# Patient Record
Sex: FEMALE | Race: WHITE
[De-identification: names, ages, dates, MRNs, and addresses within clinical notes are randomized per-mention and may not be internally consistent; named-entity substitution may affect disease eponyms.]

---

## 2017-04-26 ENCOUNTER — HOSPITAL ENCOUNTER (OUTPATIENT)
Dept: HOSPITAL 53 - M SFHCWAGY | Age: 62
End: 2017-04-26
Attending: NURSE PRACTITIONER
Payer: COMMERCIAL

## 2017-04-26 ENCOUNTER — HOSPITAL ENCOUNTER (OUTPATIENT)
Dept: HOSPITAL 53 - M WHC | Age: 62
End: 2017-04-26
Attending: NURSE PRACTITIONER
Payer: COMMERCIAL

## 2017-04-26 DIAGNOSIS — Z12.31: Primary | ICD-10-CM

## 2017-04-26 DIAGNOSIS — Z80.3: ICD-10-CM

## 2017-04-26 DIAGNOSIS — Z12.4: Primary | ICD-10-CM

## 2017-04-26 DIAGNOSIS — N95.2: ICD-10-CM

## 2017-04-26 DIAGNOSIS — Z78.0: ICD-10-CM

## 2017-04-26 NOTE — REPMRS
Patient History

The patient states she had a clinical breast exam in 04/17

Patient is postmenopausal.

Family history of breast cancer in mother at age 50 or over.

 

Digital Woman Screen Mammo: April 26, 2017 - Exam #: 

[id number]

Bilateral CC and MLO view(s) were taken.

 

Technologist: Gissel Loco, Technologist

Prior study comparison: April 24, 2015, digital woman screen 

mammo performed at [facility].  September 14, 2009,

bilateral digital mammo screening bilat performed at [facility].

 

FINDINGS: There are scattered fibroglandular densities.  There 

has been no change in the appearance of the mammogram from the 

prior studies.  There is a mild amount of residual fibroglandular

tissue which is fairly symmetric. There is no interval 

development of dominant mass, architectural distortion, or 

clustered microcalcification suggestive of malignancy.

 

ASSESSMENT: BI-RADS/ACR category 1 mammogram. Negative.

 

Recommendation

Routine screening mammogram in 1 year (for women over age 40).

This mammogram was interpreted with the aid of an FDA-approved 

computer-aided dectection system.

 

Electronically Signed By: Yahir Viera MD 04/26/17 [id number]

## 2020-10-08 ENCOUNTER — HOSPITAL ENCOUNTER (EMERGENCY)
Dept: HOSPITAL 53 - M ED | Age: 65
Discharge: HOME | End: 2020-10-08
Payer: MEDICARE

## 2020-10-08 VITALS — BODY MASS INDEX: 28.23 KG/M2 | HEIGHT: 64 IN | WEIGHT: 165.35 LBS

## 2020-10-08 VITALS — DIASTOLIC BLOOD PRESSURE: 67 MMHG | SYSTOLIC BLOOD PRESSURE: 150 MMHG

## 2020-10-08 DIAGNOSIS — H81.10: Primary | ICD-10-CM

## 2020-10-08 LAB
HCT VFR BLD AUTO: 40.6 % (ref 36–47)
HGB BLD-MCNC: 13.5 G/DL (ref 12–15.5)
MCH RBC QN AUTO: 29.3 PG (ref 27–33)
MCHC RBC AUTO-ENTMCNC: 33.3 G/DL (ref 32–36.5)
MCV RBC AUTO: 88.3 FL (ref 80–96)
PLATELET # BLD AUTO: 256 10^3/UL (ref 150–450)
RBC # BLD AUTO: 4.6 10^6/UL (ref 4–5.4)
WBC # BLD AUTO: 9.8 10^3/UL (ref 4–10)

## 2020-10-08 NOTE — REPVR
PROCEDURE INFORMATION: 

Exam: CT Head Without Contrast 

Exam date and time: 10/8/2020 10:58 AM 

Age: 64 years old 

Clinical indication: Dizziness; Additional info: Dizzy 



TECHNIQUE: 

Imaging protocol: Computed tomography of the head without contrast. 

Radiation optimization: All CT scans at this facility use at least one of these 

dose optimization techniques: automated exposure control; mA and/or kV 

adjustment per patient size (includes targeted exams where dose is matched to 

clinical indication); or iterative reconstruction. 



COMPARISON: 

No relevant prior studies available. 



FINDINGS: 

Brain: Normal. No hemorrhage. Unremarkable white matter. No mass effect. 

Cerebral ventricles: No ventriculomegaly. 

Bones/joints: Unremarkable. No acute fracture. 

Paranasal sinuses: Visualized sinuses are unremarkable. No fluid levels. 

Mastoid air cells: Visualized mastoid air cells are well aerated. 

Soft tissues: Unremarkable. 



IMPRESSION: 

No acute intracranial abnormality. 



Electronically signed by: Jeanie Farias On 10/08/2020  11:16:40 AM

## 2020-10-09 NOTE — ECGEPIP
[facility] - ED

                                       

                                       Test Date:    2020-10-08

Pat Name:     TYSON DUTTA           Department:   

Patient ID:   [id number]                 Room:         -

Gender:       Female                   Technician:   [id number]

:          1955               Requested By: Susanna Roth 

Order Number: [id number]     Reading MD:   Zafar White

                                 Measurements

Intervals                              Axis          

Rate:         65                       P:            4

MN:           152                      QRS:          -15

QRSD:         104                      T:            -2

QT:           388                                    

QTc:          404                                    

                           Interpretive Statements

SINUS RHYTHM

POOR R WAVE PROGRESSION

MODERATE VOLTAGE CRITERIA FOR LVH, CONSIDER NORMAL VARIANT

MODERATE INTRAVENTRICULAR CONDUCTION DELAY

NO PRIORS FOR COMPARISON

Electronically Signed on 10-9-2020 7:19:09 EDT by Zafar White

## 2020-10-15 ENCOUNTER — HOSPITAL ENCOUNTER (OUTPATIENT)
Dept: HOSPITAL 53 - M PT | Age: 65
LOS: 16 days | End: 2020-10-31
Payer: MEDICARE

## 2020-10-15 DIAGNOSIS — R42: Primary | ICD-10-CM

## 2023-02-16 ENCOUNTER — HOSPITAL ENCOUNTER (OUTPATIENT)
Dept: HOSPITAL 53 - M SFHCWAGY | Age: 68
End: 2023-02-16
Payer: MEDICARE

## 2023-02-16 ENCOUNTER — HOSPITAL ENCOUNTER (OUTPATIENT)
Dept: HOSPITAL 53 - M WHC | Age: 68
End: 2023-02-16
Payer: MEDICARE

## 2023-02-16 DIAGNOSIS — Z12.31: Primary | ICD-10-CM

## 2023-02-16 DIAGNOSIS — Z12.4: Primary | ICD-10-CM

## 2023-02-16 PROCEDURE — 87624 HPV HI-RISK TYP POOLED RSLT: CPT

## 2024-10-21 ENCOUNTER — HOSPITAL ENCOUNTER (OUTPATIENT)
Dept: HOSPITAL 53 - M WHC | Age: 69
End: 2024-10-21
Payer: MEDICARE

## 2024-10-21 DIAGNOSIS — Z12.31: Primary | ICD-10-CM

## 2024-10-21 DIAGNOSIS — R92.323: ICD-10-CM

## 2025-01-28 ENCOUNTER — HOSPITAL ENCOUNTER (OUTPATIENT)
Dept: HOSPITAL 53 - M OPP | Age: 70
Discharge: HOME | End: 2025-01-28
Attending: SURGERY
Payer: MEDICARE

## 2025-01-28 VITALS — TEMPERATURE: 96.7 F

## 2025-01-28 VITALS — WEIGHT: 167.8 LBS | HEIGHT: 64 IN | BODY MASS INDEX: 28.65 KG/M2

## 2025-01-28 VITALS — SYSTOLIC BLOOD PRESSURE: 117 MMHG | DIASTOLIC BLOOD PRESSURE: 62 MMHG | OXYGEN SATURATION: 97 %

## 2025-01-28 DIAGNOSIS — R19.5: ICD-10-CM

## 2025-01-28 DIAGNOSIS — D12.5: ICD-10-CM

## 2025-01-28 DIAGNOSIS — Z12.11: Primary | ICD-10-CM

## 2025-07-28 ENCOUNTER — HOSPITAL ENCOUNTER (OUTPATIENT)
Dept: HOSPITAL 53 - M RAD | Age: 70
End: 2025-07-28
Payer: MEDICARE

## 2025-07-28 DIAGNOSIS — H81.11: Primary | ICD-10-CM

## 2025-07-28 DIAGNOSIS — E04.2: ICD-10-CM

## 2025-07-28 DIAGNOSIS — I65.23: ICD-10-CM
